# Patient Record
Sex: FEMALE | Race: ASIAN | Employment: FULL TIME | URBAN - METROPOLITAN AREA
[De-identification: names, ages, dates, MRNs, and addresses within clinical notes are randomized per-mention and may not be internally consistent; named-entity substitution may affect disease eponyms.]

---

## 2019-10-14 ENCOUNTER — TELEPHONE (OUTPATIENT)
Dept: FAMILY MEDICINE | Facility: CLINIC | Age: 34
End: 2019-10-14

## 2019-10-14 NOTE — LETTER
October 14, 2019    Sherrell Nicolas  [address] [place] [id number]    Dear Sherrell Nicolas,     At [facility] we care about your health and are committed to providing quality patient care.    Which includes staying current on preventive cancer screenings.  You can increase your chances of finding and treating cancers through regular screenings.      Our records indicate you may be due for the following preventive screening(s):    Cervical Cancer Screening    Pap smear is a screening test used to detect cervical cancer. It is recommended every three years for women 21 and older. The test should be done at least once every three years but women who are at greater risk for cervical cancer may need to have the test more often.    To schedule an appointment or discuss this screening further, you may contact us by phone at the [facility] at [phone] or online through the [email or web address] @ [email or web address]    If you have had any of the screenings listed above at another facility, please call us so that we may update your chart.      Your partners in health,      Quality Committee at [facility]

## 2019-10-14 NOTE — TELEPHONE ENCOUNTER
Panel Management Review   One phone call and send letter if unable to reach them or [employer] message and send letter if not read after 2 weeks (You will get a message to your inbasket)          Health Maintenance Due   Topic Date Due     HIV SCREENING  08/15/1999     HPV  08/15/2005     DTAP/TDAP/TD IMMUNIZATION (1 - Tdap) 08/15/2009     PREVENTIVE CARE VISIT  08/22/2015     PHQ-2  01/01/2019     INFLUENZA VACCINE (1) 09/01/2019     PAP  08/22/2019        Fail List measure:         Patient is due/failing the following:   PAP    Action needed:   Patient needs office visit for Annual Exam.    Type of outreach:    Sent letter.    Questions for provider review:    None                                                                                                                                Joceline Rothman MA

## 2020-08-28 ENCOUNTER — OFFICE VISIT (OUTPATIENT)
Dept: FAMILY MEDICINE | Facility: CLINIC | Age: 35
End: 2020-08-28
Payer: COMMERCIAL

## 2020-08-28 VITALS
HEART RATE: 91 BPM | TEMPERATURE: 98 F | BODY MASS INDEX: 31.28 KG/M2 | OXYGEN SATURATION: 95 % | HEIGHT: 57 IN | DIASTOLIC BLOOD PRESSURE: 85 MMHG | SYSTOLIC BLOOD PRESSURE: 119 MMHG | WEIGHT: 145 LBS

## 2020-08-28 DIAGNOSIS — N76.0 BACTERIAL VAGINOSIS: ICD-10-CM

## 2020-08-28 DIAGNOSIS — N89.8 VAGINAL ODOR: ICD-10-CM

## 2020-08-28 DIAGNOSIS — R20.2 PARESTHESIAS: Primary | ICD-10-CM

## 2020-08-28 DIAGNOSIS — B96.89 BACTERIAL VAGINOSIS: ICD-10-CM

## 2020-08-28 DIAGNOSIS — M54.2 CERVICALGIA: ICD-10-CM

## 2020-08-28 LAB
ALBUMIN SERPL-MCNC: 3.9 G/DL (ref 3.4–5)
ALP SERPL-CCNC: 85 U/L (ref 40–150)
ALT SERPL W P-5'-P-CCNC: 56 U/L (ref 0–50)
ANION GAP SERPL CALCULATED.3IONS-SCNC: 7 MMOL/L (ref 3–14)
AST SERPL W P-5'-P-CCNC: 24 U/L (ref 0–45)
BASOPHILS # BLD AUTO: 0 10E9/L (ref 0–0.2)
BASOPHILS NFR BLD AUTO: 0.5 %
BILIRUB SERPL-MCNC: 0.2 MG/DL (ref 0.2–1.3)
BUN SERPL-MCNC: 17 MG/DL (ref 7–30)
CALCIUM SERPL-MCNC: 9 MG/DL (ref 8.5–10.1)
CHLORIDE SERPL-SCNC: 106 MMOL/L (ref 94–109)
CO2 SERPL-SCNC: 26 MMOL/L (ref 20–32)
CREAT SERPL-MCNC: 0.65 MG/DL (ref 0.52–1.04)
CRP SERPL-MCNC: 5 MG/L (ref 0–8)
DIFFERENTIAL METHOD BLD: NORMAL
EOSINOPHIL # BLD AUTO: 0.1 10E9/L (ref 0–0.7)
EOSINOPHIL NFR BLD AUTO: 1.3 %
ERYTHROCYTE [DISTWIDTH] IN BLOOD BY AUTOMATED COUNT: 13 % (ref 10–15)
ERYTHROCYTE [SEDIMENTATION RATE] IN BLOOD BY WESTERGREN METHOD: 15 MM/H (ref 0–20)
FERRITIN SERPL-MCNC: 149 NG/ML (ref 12–150)
GFR SERPL CREATININE-BSD FRML MDRD: >90 ML/MIN/{1.73_M2}
GLUCOSE SERPL-MCNC: 158 MG/DL (ref 70–99)
HBA1C MFR BLD: 5.6 % (ref 0–5.6)
HCT VFR BLD AUTO: 42.7 % (ref 35–47)
HGB BLD-MCNC: 14 G/DL (ref 11.7–15.7)
LYMPHOCYTES # BLD AUTO: 2.7 10E9/L (ref 0.8–5.3)
LYMPHOCYTES NFR BLD AUTO: 33.8 %
MAGNESIUM SERPL-MCNC: 2.1 MG/DL (ref 1.6–2.3)
MCH RBC QN AUTO: 29.4 PG (ref 26.5–33)
MCHC RBC AUTO-ENTMCNC: 32.8 G/DL (ref 31.5–36.5)
MCV RBC AUTO: 90 FL (ref 78–100)
MONOCYTES # BLD AUTO: 0.4 10E9/L (ref 0–1.3)
MONOCYTES NFR BLD AUTO: 5.6 %
NEUTROPHILS # BLD AUTO: 4.6 10E9/L (ref 1.6–8.3)
NEUTROPHILS NFR BLD AUTO: 58.8 %
PLATELET # BLD AUTO: 239 10E9/L (ref 150–450)
POTASSIUM SERPL-SCNC: 3.3 MMOL/L (ref 3.4–5.3)
PROT SERPL-MCNC: 8.1 G/DL (ref 6.8–8.8)
RBC # BLD AUTO: 4.77 10E12/L (ref 3.8–5.2)
SODIUM SERPL-SCNC: 139 MMOL/L (ref 133–144)
SPECIMEN SOURCE: ABNORMAL
TSH SERPL DL<=0.005 MIU/L-ACNC: 0.94 MU/L (ref 0.4–4)
VIT B12 SERPL-MCNC: 446 PG/ML (ref 193–986)
WBC # BLD AUTO: 7.8 10E9/L (ref 4–11)
WET PREP SPEC: ABNORMAL

## 2020-08-28 PROCEDURE — 82607 VITAMIN B-12: CPT | Performed by: PREVENTIVE MEDICINE

## 2020-08-28 PROCEDURE — 99203 OFFICE O/P NEW LOW 30 MIN: CPT | Performed by: PREVENTIVE MEDICINE

## 2020-08-28 PROCEDURE — 36415 COLL VENOUS BLD VENIPUNCTURE: CPT | Performed by: PREVENTIVE MEDICINE

## 2020-08-28 PROCEDURE — 86140 C-REACTIVE PROTEIN: CPT | Performed by: PREVENTIVE MEDICINE

## 2020-08-28 PROCEDURE — 87491 CHLMYD TRACH DNA AMP PROBE: CPT | Performed by: PREVENTIVE MEDICINE

## 2020-08-28 PROCEDURE — 82728 ASSAY OF FERRITIN: CPT | Performed by: PREVENTIVE MEDICINE

## 2020-08-28 PROCEDURE — 87210 SMEAR WET MOUNT SALINE/INK: CPT | Performed by: PREVENTIVE MEDICINE

## 2020-08-28 PROCEDURE — 85652 RBC SED RATE AUTOMATED: CPT | Performed by: PREVENTIVE MEDICINE

## 2020-08-28 PROCEDURE — 80050 GENERAL HEALTH PANEL: CPT | Performed by: PREVENTIVE MEDICINE

## 2020-08-28 PROCEDURE — 83036 HEMOGLOBIN GLYCOSYLATED A1C: CPT | Performed by: PREVENTIVE MEDICINE

## 2020-08-28 PROCEDURE — 83735 ASSAY OF MAGNESIUM: CPT | Performed by: PREVENTIVE MEDICINE

## 2020-08-28 PROCEDURE — 87591 N.GONORRHOEAE DNA AMP PROB: CPT | Performed by: PREVENTIVE MEDICINE

## 2020-08-28 RX ORDER — METHOCARBAMOL 500 MG/1
500-1000 TABLET, FILM COATED ORAL 4 TIMES DAILY PRN
Qty: 30 TABLET | Refills: 0 | Status: SHIPPED | OUTPATIENT
Start: 2020-08-28

## 2020-08-28 RX ORDER — METRONIDAZOLE 500 MG/1
500 TABLET ORAL 2 TIMES DAILY
Qty: 14 TABLET | Refills: 0 | Status: SHIPPED | OUTPATIENT
Start: 2020-08-28 | End: 2020-09-04

## 2020-08-28 ASSESSMENT — MIFFLIN-ST. JEOR: SCORE: 1221.6

## 2020-08-28 NOTE — RESULT ENCOUNTER NOTE
"Please CALL patient:    Dear Sherrell Nicolas,    You do not have diabetes.  Marker of inflammation ESR is not high.    The lab test shows evidence of bacterial vaginosis(BV).It is one of the most common causes of vaginitis.It represents a complex change in the vaginal abran. Those with symptoms present with an unpleasant, \"fishy smelling\" discharge that is more noticeable after coitus.I do recommend treatment with metronidazole orally twice a day for a week, that's been sent to the pharmacy for you to .The medication  may give metallic taste in mouth, but over all well tolerated by most. Avoid alcohol while using this medication.     Other labs are pending.     Regards,    Virgie Dumont MD MPH    "

## 2020-08-28 NOTE — PROGRESS NOTES
"Ana Nicolas is a 35 year old female who presents to clinic today for the following health issues:    HPI       Musculoskeletal problem/pain  Onset/Duration: 2 years   Description  Location: Both forearms and both lower extremities and neck pain   Joint Swelling: no  Redness: no  Pain: no  Warmth: no  Intensity:  Moderate   Progression of Symptoms:  worsening  Accompanying signs and symptoms:   Fevers: no  Numbness/tingling/weakness: YES- in forearms and legs   History  Trauma to the area: no  Recent illness:  no  Previous similar problem: YES  Previous evaluation:  YES  Precipitating or alleviating factors:  Aggravating factors include: when patient becomes active or walks is the only time she has numbness   Therapies tried and outcome: nothing  Getting more  If lays down, better  More with exertion  Better with rest  Does keep awake at night  No rash or skin changes  Paresthesias++  Some headaches with neck pain  Has bilateral neck pain  No loss of vision   No past history of diabetes  No anemia   Feels numbness in her arms when she lifts them over her head     Vaginal Symptoms  Onset/Duration: Intermittent  Description:  Vaginal Discharge: white+   Itching (Pruritis): no  Burning sensation:  no  Odor: YES  Accompanying Signs & Symptoms:  Urinary symptoms: no  Abdominal pain: no  Fever: no  History:   Sexually active: YES  New Partner: no  Possibility of Pregnancy:  no  Recent antibiotic use: no  Previous vaginitis issues: not applicable  Precipitating or alleviating factors: None  Therapies tried and outcome: none    Due for pap, will schedule later       Review of Systems   Constitutional, HEENT, cardiovascular, pulmonary, gi and gu systems are negative, except as otherwise noted.      Objective    /85   Pulse 91   Temp 98  F (36.7  C) (Oral)   Ht 1.448 m (4' 9\")   Wt 65.8 kg (145 lb)   LMP 08/19/2020 (Approximate)   SpO2 95%   Breastfeeding No   BMI 31.38 kg/m    Body mass index " is 31.38 kg/m .  Physical Exam   GENERAL APPEARANCE: healthy, alert and no distress  EYES: Eyes grossly normal to inspection and conjunctivae and sclerae normal  HENT: nose and mouth without ulcers or lesions  NECK: no adenopathy and trachea midline and normal to palpation  RESP: lungs clear to auscultation - no rales, rhonchi or wheezes  CV: regular rates and rhythm, normal S1 S2, no S3 or S4 and no murmur, click or rub  ABDOMEN: soft, non-tender and no rebound or guarding   MS: extremities normal- no gross deformities noted and peripheral pulses normal  SKIN: no suspicious lesions or rashes  NEURO: Normal strength and tone, mentation intact and speech normal, gait normal able to heel and toe walk  PSYCH: mentation appears normal    Cervical: No swelling, bruising, erythema atrophy or deformity.  No tenderness noted.  Range of motion of the cervical spine is full in all directions without focal deficit.  Strength is full.  Distal motor exam is intact.  No point tenderness over spinous processes. Mild tenderness along bilateral trapezius muscles.    Lumbar spine: No swelling, bruising, erythema atrophy or deformity.  No tenderness noted on palpation of spinous processes.  Range of motion of the lumbar spine is full in all directions without focal deficit.  Strength is full.  SLR negative bilaterally.  Distal motor and sensory exam is intact.    Distal pulses intact. No sacroiliac tenderness bilaterally.  Great toe extension normal.         No results found for this or any previous visit (from the past 24 hour(s)).        Assessment & Plan     Sherrell was seen today for musculoskeletal problem.    Diagnoses and all orders for this visit:    Paresthesias  -     CBC with platelets differential  -     Comprehensive metabolic panel  -     Hemoglobin A1c  -     Vitamin B12  -     TSH with free T4 reflex  -     Ferritin  -     Magnesium  -     ESR: Erythrocyte sedimentation rate  -     CRP, inflammation  -await labs,  "further plan to be determined then     Cervicalgia  -     ESR: Erythrocyte sedimentation rate  -     CRP, inflammation  -     methocarbamol (ROBAXIN) 500 MG tablet; Take 1-2 tablets (500-1,000 mg) by mouth 4 times daily as needed for muscle spasms, sedation side effect of medication reviewed     Vaginal odor  -     Chlamydia trachomatis PCR  -     Neisseria gonorrhoeae PCR  -     Wet prep  -due for pap smear          BMI:   Estimated body mass index is 31.38 kg/m  as calculated from the following:    Height as of this encounter: 1.448 m (4' 9\").    Weight as of this encounter: 65.8 kg (145 lb).     Return in about 2 weeks (around 9/11/2020) if symptoms worsen or fail to improve.    Virgie Dumont MD MPH    [facility]    "

## 2020-08-30 LAB
C TRACH DNA SPEC QL NAA+PROBE: NEGATIVE
N GONORRHOEA DNA SPEC QL NAA+PROBE: NEGATIVE
SPECIMEN SOURCE: NORMAL
SPECIMEN SOURCE: NORMAL

## 2020-08-31 ENCOUNTER — TELEPHONE (OUTPATIENT)
Dept: FAMILY MEDICINE | Facility: CLINIC | Age: 35
End: 2020-08-31

## 2020-08-31 NOTE — TELEPHONE ENCOUNTER
.Reason for call:  Other   Patient called regarding (reason for call): prescription  Additional comments: pt went to  script and pharmacy did not have it    Phone number to reach patient:  Home number on file [phone] (home)    Best Time:  anytime    Can we leave a detailed message on this number?  YES    Travel screening: Negative

## 2020-09-01 DIAGNOSIS — R73.9 HYPERGLYCEMIA: Primary | ICD-10-CM

## 2020-09-01 NOTE — RESULT ENCOUNTER NOTE
Please send a letter:      Dear Sherrell Nicolas,    Tests for Gonorrhea and Chlamydia are negative.  Vitamin B 12, thyroid function, iron stores, magnesium, kidney function and markers of inflammation are normal.  Basic blood count is not showing anemia or infection.    Potassium is slightly low, stay well hydrated and eat potassium rich foods such as bananas, green veggies, and avocados.  Non fasting glucose is slightly high. I would recommend we check an additional test to make sure you do not have diabetes or pre diabetes. I have placed orders for this blood test, please call the clinic to make an appointment for labs only.   Liver function test is borderline high but stable compared to last check.     Regards,    Virgie Dumont MD MPH

## 2020-09-01 NOTE — TELEPHONE ENCOUNTER
Called and left a voicemail message of Dr Dumont's response.  Val Mohr MA  [facility]  2nd Floor  Primary Care

## 2020-09-01 NOTE — TELEPHONE ENCOUNTER
Please look at my clinic note from 8/28/2020. Script for Metronidazole was sent to the [facility] in [place].  Thank you,  Virgie Dumont MD MPH

## 2021-03-05 ENCOUNTER — OFFICE VISIT (OUTPATIENT)
Dept: FAMILY MEDICINE | Facility: CLINIC | Age: 36
End: 2021-03-05
Payer: COMMERCIAL

## 2021-03-05 VITALS
DIASTOLIC BLOOD PRESSURE: 74 MMHG | SYSTOLIC BLOOD PRESSURE: 113 MMHG | BODY MASS INDEX: 29.86 KG/M2 | HEIGHT: 57 IN | TEMPERATURE: 98.4 F | OXYGEN SATURATION: 97 % | HEART RATE: 79 BPM | WEIGHT: 138.4 LBS

## 2021-03-05 DIAGNOSIS — N89.8 VAGINAL DISCHARGE: Primary | ICD-10-CM

## 2021-03-05 DIAGNOSIS — Z12.4 SCREENING FOR CERVICAL CANCER: ICD-10-CM

## 2021-03-05 LAB
SPECIMEN SOURCE: NORMAL
WET PREP SPEC: NORMAL

## 2021-03-05 PROCEDURE — 87210 SMEAR WET MOUNT SALINE/INK: CPT | Performed by: PREVENTIVE MEDICINE

## 2021-03-05 PROCEDURE — G0145 SCR C/V CYTO,THINLAYER,RESCR: HCPCS | Performed by: PREVENTIVE MEDICINE

## 2021-03-05 PROCEDURE — 87624 HPV HI-RISK TYP POOLED RSLT: CPT | Performed by: PREVENTIVE MEDICINE

## 2021-03-05 PROCEDURE — 87591 N.GONORRHOEAE DNA AMP PROB: CPT | Performed by: PREVENTIVE MEDICINE

## 2021-03-05 PROCEDURE — 99214 OFFICE O/P EST MOD 30 MIN: CPT | Performed by: PREVENTIVE MEDICINE

## 2021-03-05 PROCEDURE — 87491 CHLMYD TRACH DNA AMP PROBE: CPT | Performed by: PREVENTIVE MEDICINE

## 2021-03-05 ASSESSMENT — PAIN SCALES - GENERAL: PAINLEVEL: NO PAIN (0)

## 2021-03-05 ASSESSMENT — MIFFLIN-ST. JEOR: SCORE: 1191.66

## 2021-03-05 NOTE — PROGRESS NOTES
"    Assessment & Plan     Vaginal discharge  -History of recurrent Bacterial vaginosis  -if positive for BV today then would start recurrent BV regimen   - Wet prep  - Chlamydia trachomatis PCR  - Neisseria gonorrhoeae PCR    Vaginal Health and Self Care    Wear loose-fitting, cotton clothing. Stay away from nylon and synthetics, because they hold heat and moisture close to the skin, which makes it easier for an infection to start. You may want to remove pajama bottoms or underwear when you sleep.     Avoid sex while vaginal tissues are irritated to allow them to heal.     Consider using a water-based lubricant during sexual activity.    Do not scratch the vaginal area. Relieve itching with a cold water compress or cool baths. Warm baths may also relieve pain and itching.    You may use an over-the-counter hydrocortisone cream for relief of itching.  Do not use for more that 2 weeks at a time.    Do not douche, use scented soaps, sprays or other \"feminine products\" as these can irritate vaginal tissue.    Properly clean the genital area while bathing or showering. Proper wiping after using the toilet can also help (front to back).      Screening for cervical cancer  -screening guidelines reviewed  -no past abnormal pap smears   - HPV High Risk Types DNA Cervical  - Pap imaged thin layer screen with HPV - recommended age 30 - 65 years (select HPV order below)      25 minutes spent on the date of the encounter doing chart review, history and exam, documentation and further activities as noted above       BMI:   Estimated body mass index is 29.95 kg/m  as calculated from the following:    Height as of this encounter: 1.448 m (4' 9\").    Weight as of this encounter: 62.8 kg (138 lb 6.4 oz).       Return in about 1 year (around 3/5/2022) for Routine preventive, with me, in person.    Virgie Dumont MD MPH    [facility]    Ana Wynne is a 35 year old who presents for the following " "health issues     HPI       Vaginal Symptoms  Onset/Duration: Follow up from August appointment  Description:  Vaginal Discharge: white   Itching (Pruritis): no  Burning sensation:  no  Odor: no  Accompanying Signs & Symptoms:  Urinary symptoms: no  Abdominal pain: no  Fever: no  History:   Sexually active: YES  New Partner: no  Possibility of Pregnancy:  no  Recent antibiotic use: no  Previous vaginitis issues: unknown  Precipitating or alleviating factors: None  Therapies tried and outcome: none  At that time was noted to have Bacterial Vaginosis, treated with Metronidazole, symptoms were better for a short period of time and then recurred  Patient states she has this discharge the whole year.   Also due for pap smear  No bleeding  No abdominal pain  No use of vaginal douche  No tobacco   Has to use pantiliner/pad due to discharge      Review of Systems   Constitutional, HEENT, cardiovascular, pulmonary, gi and gu systems are negative, except as otherwise noted.      Objective    /74 (BP Location: Left arm, Patient Position: Sitting, Cuff Size: Adult Regular)   Pulse 79   Temp 98.4  F (36.9  C) (Oral)   Ht 1.448 m (4' 9\")   Wt 62.8 kg (138 lb 6.4 oz)   LMP 02/26/2021 (Approximate)   SpO2 97%   BMI 29.95 kg/m    Body mass index is 29.95 kg/m .  Physical Exam   GENERAL: healthy, alert and no distress  EYES: Eyes grossly normal to inspection, PERRL and conjunctivae and sclerae normal   (female): normal female external genitalia, normal urethral meatus, vaginal mucosa pink, moist, well rugated, and slightly erythematous cervix, white discharge+   MS: no gross musculoskeletal defects noted, no edema  SKIN: no suspicious lesions or rashes  NEURO: Normal strength and tone, mentation intact and speech normal  PSYCH: mentation appears normal, affect normal/bright        "

## 2021-03-05 NOTE — LETTER
March 8, 2021      Sherrell Nicolas  [address] [place] [address]        Dear Sherrell Nicolas,     Vaginal swab did not show any infections with yeast, bacterial vaginosis or trichomonas.   Please let me know if you have any questions and thank you for choosing [place].     Regards,     Virgie Dumont MD MPH     Resulted Orders   Wet prep   Result Value Ref Range    Specimen Description Vagina     Wet Prep No yeast seen     Wet Prep No clue cells seen     Wet Prep No Trichomonas seen     Wet Prep Moderate  WBC'S seen

## 2021-03-05 NOTE — PATIENT INSTRUCTIONS
At [facility], we strive to deliver an exceptional experience to you, every time we see you. If you receive a survey, please complete it as we do value your feedback.  If you have MyChart, you can expect to receive results automatically within 24 hours of their completion.  Your provider will send a note interpreting your results as well.   If you do not have MyChart, you should receive your results in about a week by mail.    Your care team:                            Family Medicine Internal Medicine   MD Cisco Tran MD Shantel Branch-Fleming, MD Srinivasa Vaka, MD Katya Belousova, PALILY Rojo, APRN CNP    Gilles Busby, MD Pediatrics   Jani Peters, PALILY Vivas, CNP MD Natasha Romo APRN CNP   MD Carla Delatorre MD Deborah Mielke, MD Audelia Lemus, APRN [facility]      Clinic hours: Monday - Thursday 7 am-6 pm; Fridays 7 am-5 pm.   Urgent care: Monday - Friday 11 am-9 pm; Saturday and Sunday 9 am-5 pm.    Clinic: [phone]       [facility]: Monday - Thursday 8 am - 7 pm; Friday 8 am - 6 pm  [facility]: [phone]     Use [email or web address] for 24/7 diagnosis and treatment of dozens of conditions.

## 2021-03-06 NOTE — RESULT ENCOUNTER NOTE
Please send a letter:    Dear Sherrell Nicolas,    Vaginal swab did not show any infections with yeast, bacterial vaginosis or trichomonas.  Please let me know if you have any questions and thank you for choosing [place].    Regards,    Virgie Dumont MD MPH

## 2021-03-09 LAB
COPATH REPORT: NORMAL
PAP: NORMAL

## 2021-03-09 NOTE — RESULT ENCOUNTER NOTE
Please send a letter:    Dear Sherrell Nicolas,    Tests for Gonorrhea and Chlamydia are not showing any abnormalities.  Please let me know if you have any questions and thank you for choosing [employer].    Regards,    Virgie Dumont MD MPH

## 2021-03-11 LAB
FINAL DIAGNOSIS: NORMAL
HPV HR 12 DNA CVX QL NAA+PROBE: NEGATIVE
HPV16 DNA SPEC QL NAA+PROBE: NEGATIVE
HPV18 DNA SPEC QL NAA+PROBE: NEGATIVE
SPECIMEN DESCRIPTION: NORMAL
SPECIMEN SOURCE CVX/VAG CYTO: NORMAL

## 2022-07-06 ENCOUNTER — PATIENT OUTREACH (OUTPATIENT)
Dept: CARE COORDINATION | Facility: CLINIC | Age: 37
End: 2022-07-06

## 2022-07-06 DIAGNOSIS — Z71.89 COUNSELING AND COORDINATION OF CARE: Primary | ICD-10-CM

## 2022-07-25 ENCOUNTER — PATIENT OUTREACH (OUTPATIENT)
Dept: CARE COORDINATION | Facility: CLINIC | Age: 37
End: 2022-07-25

## 2022-07-25 SDOH — ECONOMIC STABILITY: TRANSPORTATION INSECURITY
IN THE PAST 12 MONTHS, HAS LACK OF TRANSPORTATION KEPT YOU FROM MEETINGS, WORK, OR FROM GETTING THINGS NEEDED FOR DAILY LIVING?: NO

## 2022-07-25 SDOH — ECONOMIC STABILITY: TRANSPORTATION INSECURITY
IN THE PAST 12 MONTHS, HAS THE LACK OF TRANSPORTATION KEPT YOU FROM MEDICAL APPOINTMENTS OR FROM GETTING MEDICATIONS?: NO

## 2022-07-25 SDOH — ECONOMIC STABILITY: FOOD INSECURITY: WITHIN THE PAST 12 MONTHS, YOU WORRIED THAT YOUR FOOD WOULD RUN OUT BEFORE YOU GOT MONEY TO BUY MORE.: SOMETIMES TRUE

## 2022-07-25 SDOH — ECONOMIC STABILITY: FOOD INSECURITY: WITHIN THE PAST 12 MONTHS, THE FOOD YOU BOUGHT JUST DIDN'T LAST AND YOU DIDN'T HAVE MONEY TO GET MORE.: NEVER TRUE

## 2022-07-25 ASSESSMENT — ACTIVITIES OF DAILY LIVING (ADL): DEPENDENT_IADLS:: INDEPENDENT

## 2022-07-25 ASSESSMENT — SOCIAL DETERMINANTS OF HEALTH (SDOH): HOW HARD IS IT FOR YOU TO PAY FOR THE VERY BASICS LIKE FOOD, HOUSING, MEDICAL CARE, AND HEATING?: NOT HARD AT ALL

## 2022-08-23 ENCOUNTER — PATIENT OUTREACH (OUTPATIENT)
Dept: CARE COORDINATION | Facility: CLINIC | Age: 37
End: 2022-08-23

## 2022-08-30 ENCOUNTER — LAB REQUISITION (OUTPATIENT)
Dept: LAB | Facility: CLINIC | Age: 37
End: 2022-08-30
Payer: COMMERCIAL

## 2022-08-30 DIAGNOSIS — Z3A.36 36 WEEKS GESTATION OF PREGNANCY: ICD-10-CM

## 2022-08-30 PROCEDURE — 87653 STREP B DNA AMP PROBE: CPT | Mod: ORL | Performed by: ADVANCED PRACTICE MIDWIFE

## 2022-08-31 LAB — GP B STREP DNA SPEC QL NAA+PROBE: NEGATIVE

## 2022-09-15 ASSESSMENT — ACTIVITIES OF DAILY LIVING (ADL): DEPENDENT_IADLS:: INDEPENDENT

## 2022-10-05 ENCOUNTER — PATIENT OUTREACH (OUTPATIENT)
Dept: CARE COORDINATION | Facility: CLINIC | Age: 37
End: 2022-10-05